# Patient Record
Sex: FEMALE | Race: WHITE | URBAN - METROPOLITAN AREA
[De-identification: names, ages, dates, MRNs, and addresses within clinical notes are randomized per-mention and may not be internally consistent; named-entity substitution may affect disease eponyms.]

---

## 2021-10-11 ENCOUNTER — OFFICE VISIT (OUTPATIENT)
Dept: FAMILY MEDICINE CLINIC | Facility: CLINIC | Age: 46
End: 2021-10-11
Payer: COMMERCIAL

## 2021-10-11 VITALS
DIASTOLIC BLOOD PRESSURE: 84 MMHG | HEIGHT: 65 IN | OXYGEN SATURATION: 97 % | RESPIRATION RATE: 20 BRPM | WEIGHT: 293 LBS | HEART RATE: 100 BPM | TEMPERATURE: 98 F | BODY MASS INDEX: 48.82 KG/M2 | SYSTOLIC BLOOD PRESSURE: 126 MMHG

## 2021-10-11 DIAGNOSIS — Z00.00 HEALTH CARE MAINTENANCE: Primary | ICD-10-CM

## 2021-10-11 DIAGNOSIS — J30.89 SEASONAL ALLERGIC RHINITIS DUE TO OTHER ALLERGIC TRIGGER: ICD-10-CM

## 2021-10-11 PROBLEM — J30.9 ALLERGIC RHINITIS DUE TO ALLERGEN: Status: ACTIVE | Noted: 2021-10-11

## 2021-10-11 PROCEDURE — 90686 IIV4 VACC NO PRSV 0.5 ML IM: CPT | Performed by: FAMILY MEDICINE

## 2021-10-11 PROCEDURE — 3074F SYST BP LT 130 MM HG: CPT | Performed by: FAMILY MEDICINE

## 2021-10-11 PROCEDURE — 3079F DIAST BP 80-89 MM HG: CPT | Performed by: FAMILY MEDICINE

## 2021-10-11 PROCEDURE — 3008F BODY MASS INDEX DOCD: CPT | Performed by: FAMILY MEDICINE

## 2021-10-11 PROCEDURE — 90471 IMMUNIZATION ADMIN: CPT | Performed by: FAMILY MEDICINE

## 2021-10-11 PROCEDURE — 99386 PREV VISIT NEW AGE 40-64: CPT | Performed by: FAMILY MEDICINE

## 2021-10-11 RX ORDER — ALBUTEROL SULFATE 90 UG/1
AEROSOL, METERED RESPIRATORY (INHALATION)
COMMUNITY
Start: 2021-05-11 | End: 2021-10-11

## 2021-10-11 RX ORDER — METHYLPREDNISOLONE 4 MG/1
TABLET ORAL
COMMUNITY
Start: 2021-05-11 | End: 2021-10-11 | Stop reason: ALTCHOICE

## 2021-10-11 RX ORDER — MONTELUKAST SODIUM 10 MG/1
10 TABLET ORAL NIGHTLY
Qty: 90 TABLET | Refills: 3 | Status: SHIPPED | OUTPATIENT
Start: 2021-10-11

## 2021-10-11 RX ORDER — MONTELUKAST SODIUM 10 MG/1
TABLET ORAL
COMMUNITY
Start: 2021-05-11 | End: 2021-10-11

## 2021-10-11 RX ORDER — ALBUTEROL SULFATE 90 UG/1
1 AEROSOL, METERED RESPIRATORY (INHALATION) EVERY 6 HOURS PRN
Qty: 1 EACH | Refills: 3 | Status: SHIPPED | OUTPATIENT
Start: 2021-10-11

## 2021-10-11 NOTE — PROGRESS NOTES
Chief Complaint:   Patient presents with:  Establish Care      HPI:   This is a 48year old female coming in for healthcare check.,  To get established. Feeling well. Reviewed personal history. Patient sees Marisela Strickland for GYN.     Patient with r HPI    Denies shortness of breath, wheezing, cough or sputum production. GASTROINTESTINAL:  Denies abdominal pain, nausea, vomiting, constipation, diarrhea  : denies dysuria, no urinary frequency , no discharge.   MUSCULOSKELETAL:  Denies weakness, muscl noted.    ASSESSMENT AND PLAN:   1. Health care maintenance  Healthy   Check labs. - FLULAVAL INFLUENZA VACCINE QUAD PRESERVATIVE FREE 0.5 ML  - CBC WITH DIFFERENTIAL WITH PLATELET; Future  - COMP METABOLIC PANEL (14); Future  - LIPID PANEL;  Future  -

## 2021-10-23 ENCOUNTER — LABORATORY ENCOUNTER (OUTPATIENT)
Dept: LAB | Age: 46
End: 2021-10-23
Attending: FAMILY MEDICINE
Payer: COMMERCIAL

## 2021-10-23 DIAGNOSIS — Z00.00 HEALTH CARE MAINTENANCE: ICD-10-CM

## 2021-10-23 PROCEDURE — 81001 URINALYSIS AUTO W/SCOPE: CPT | Performed by: FAMILY MEDICINE

## 2021-10-23 PROCEDURE — 80050 GENERAL HEALTH PANEL: CPT | Performed by: FAMILY MEDICINE

## 2021-10-23 PROCEDURE — 80061 LIPID PANEL: CPT | Performed by: FAMILY MEDICINE

## 2021-10-28 ENCOUNTER — TELEPHONE (OUTPATIENT)
Dept: FAMILY MEDICINE CLINIC | Facility: CLINIC | Age: 46
End: 2021-10-28

## 2021-10-28 NOTE — TELEPHONE ENCOUNTER
----- Message from Adan Simons MD sent at 10/28/2021  9:16 AM CDT -----  Labs reviewed.   (Recent health check)Thyroid testing normal.Chemistry profile-blood sugar, kidney function and liver function tests normal.Cholesterol profile-total 154, HDL 44,

## 2022-10-05 ENCOUNTER — TELEPHONE (OUTPATIENT)
Dept: FAMILY MEDICINE CLINIC | Facility: CLINIC | Age: 47
End: 2022-10-05

## 2022-10-05 DIAGNOSIS — Z00.00 HEALTH CARE MAINTENANCE: Primary | ICD-10-CM

## 2022-10-05 NOTE — TELEPHONE ENCOUNTER
Future Appointments   Date Time Provider Paulette Hyatt   10/10/2022 11:30 AM [facility]   10/11/2022  2:00 PM Nilda Velazquez MD [facility]

## 2022-10-10 ENCOUNTER — LABORATORY ENCOUNTER (OUTPATIENT)
Dept: LAB | Age: 47
End: 2022-10-10
Attending: FAMILY MEDICINE
Payer: COMMERCIAL

## 2022-10-10 DIAGNOSIS — Z00.00 HEALTH CARE MAINTENANCE: ICD-10-CM

## 2022-10-10 LAB
ALBUMIN SERPL-MCNC: 3.3 G/DL (ref 3.4–5)
ALBUMIN/GLOB SERPL: 0.9 {RATIO} (ref 1–2)
ALP LIVER SERPL-CCNC: 58 U/L
ALT SERPL-CCNC: 23 U/L
ANION GAP SERPL CALC-SCNC: 5 MMOL/L (ref 0–18)
AST SERPL-CCNC: 9 U/L (ref 15–37)
BASOPHILS # BLD AUTO: 0.03 X10(3) UL (ref 0–0.2)
BASOPHILS NFR BLD AUTO: 0.3 %
BILIRUB SERPL-MCNC: 0.6 MG/DL (ref 0.1–2)
BUN BLD-MCNC: 9 MG/DL (ref 7–18)
CALCIUM BLD-MCNC: 8.7 MG/DL (ref 8.5–10.1)
CHLORIDE SERPL-SCNC: 109 MMOL/L (ref 98–112)
CHOLEST SERPL-MCNC: 189 MG/DL (ref ?–200)
CO2 SERPL-SCNC: 24 MMOL/L (ref 21–32)
CREAT BLD-MCNC: 0.71 MG/DL
EOSINOPHIL # BLD AUTO: 0.31 X10(3) UL (ref 0–0.7)
EOSINOPHIL NFR BLD AUTO: 3.2 %
ERYTHROCYTE [DISTWIDTH] IN BLOOD BY AUTOMATED COUNT: 13.1 %
FASTING PATIENT LIPID ANSWER: YES
FASTING STATUS PATIENT QL REPORTED: YES
GFR SERPLBLD BASED ON 1.73 SQ M-ARVRAT: 104 ML/MIN/1.73M2 (ref 60–?)
GLOBULIN PLAS-MCNC: 3.7 G/DL (ref 2.8–4.4)
GLUCOSE BLD-MCNC: 90 MG/DL (ref 70–99)
HCT VFR BLD AUTO: 42.9 %
HDLC SERPL-MCNC: 48 MG/DL (ref 40–59)
HGB BLD-MCNC: 13.7 G/DL
IMM GRANULOCYTES # BLD AUTO: 0.02 X10(3) UL (ref 0–1)
IMM GRANULOCYTES NFR BLD: 0.2 %
LDLC SERPL CALC-MCNC: 124 MG/DL (ref ?–100)
LYMPHOCYTES # BLD AUTO: 2.73 X10(3) UL (ref 1–4)
LYMPHOCYTES NFR BLD AUTO: 28.3 %
MCH RBC QN AUTO: 29.6 PG (ref 26–34)
MCHC RBC AUTO-ENTMCNC: 31.9 G/DL (ref 31–37)
MCV RBC AUTO: 92.7 FL
MONOCYTES # BLD AUTO: 0.74 X10(3) UL (ref 0.1–1)
MONOCYTES NFR BLD AUTO: 7.7 %
NEUTROPHILS # BLD AUTO: 5.82 X10 (3) UL (ref 1.5–7.7)
NEUTROPHILS # BLD AUTO: 5.82 X10(3) UL (ref 1.5–7.7)
NEUTROPHILS NFR BLD AUTO: 60.3 %
NONHDLC SERPL-MCNC: 141 MG/DL (ref ?–130)
OSMOLALITY SERPL CALC.SUM OF ELEC: 284 MOSM/KG (ref 275–295)
PLATELET # BLD AUTO: 337 10(3)UL (ref 150–450)
POTASSIUM SERPL-SCNC: 4 MMOL/L (ref 3.5–5.1)
PROT SERPL-MCNC: 7 G/DL (ref 6.4–8.2)
RBC # BLD AUTO: 4.63 X10(6)UL
SODIUM SERPL-SCNC: 138 MMOL/L (ref 136–145)
TRIGL SERPL-MCNC: 91 MG/DL (ref 30–149)
TSI SER-ACNC: 1.76 MIU/ML (ref 0.36–3.74)
VLDLC SERPL CALC-MCNC: 16 MG/DL (ref 0–30)
WBC # BLD AUTO: 9.7 X10(3) UL (ref 4–11)

## 2022-10-10 PROCEDURE — 80050 GENERAL HEALTH PANEL: CPT | Performed by: FAMILY MEDICINE

## 2022-10-10 PROCEDURE — 80061 LIPID PANEL: CPT | Performed by: FAMILY MEDICINE

## 2022-10-11 ENCOUNTER — OFFICE VISIT (OUTPATIENT)
Dept: FAMILY MEDICINE CLINIC | Facility: CLINIC | Age: 47
End: 2022-10-11
Payer: COMMERCIAL

## 2022-10-11 VITALS
DIASTOLIC BLOOD PRESSURE: 86 MMHG | HEART RATE: 99 BPM | RESPIRATION RATE: 18 BRPM | SYSTOLIC BLOOD PRESSURE: 124 MMHG | HEIGHT: 65 IN | OXYGEN SATURATION: 97 % | WEIGHT: 293 LBS | TEMPERATURE: 98 F | BODY MASS INDEX: 48.82 KG/M2

## 2022-10-11 DIAGNOSIS — Z00.00 HEALTH CARE MAINTENANCE: Primary | ICD-10-CM

## 2022-10-11 LAB
BILIRUB UR QL STRIP.AUTO: NEGATIVE
CLARITY UR REFRACT.AUTO: CLEAR
GLUCOSE UR STRIP.AUTO-MCNC: NEGATIVE MG/DL
KETONES UR STRIP.AUTO-MCNC: NEGATIVE MG/DL
LEUKOCYTE ESTERASE UR QL STRIP.AUTO: NEGATIVE
NITRITE UR QL STRIP.AUTO: NEGATIVE
PH UR STRIP.AUTO: 6 [PH] (ref 5–8)
PROT UR STRIP.AUTO-MCNC: NEGATIVE MG/DL
RBC UR QL AUTO: NEGATIVE
SP GR UR STRIP.AUTO: 1.01 (ref 1–1.03)
UROBILINOGEN UR STRIP.AUTO-MCNC: <2 MG/DL

## 2022-10-11 PROCEDURE — 81003 URINALYSIS AUTO W/O SCOPE: CPT | Performed by: FAMILY MEDICINE

## 2022-10-11 PROCEDURE — 3079F DIAST BP 80-89 MM HG: CPT | Performed by: FAMILY MEDICINE

## 2022-10-11 PROCEDURE — 3074F SYST BP LT 130 MM HG: CPT | Performed by: FAMILY MEDICINE

## 2022-10-11 PROCEDURE — 99396 PREV VISIT EST AGE 40-64: CPT | Performed by: FAMILY MEDICINE

## 2022-10-11 PROCEDURE — 3008F BODY MASS INDEX DOCD: CPT | Performed by: FAMILY MEDICINE

## 2022-10-11 RX ORDER — MONTELUKAST SODIUM 10 MG/1
10 TABLET ORAL NIGHTLY
Qty: 90 TABLET | Refills: 3 | Status: SHIPPED | OUTPATIENT
Start: 2022-10-11

## 2022-10-11 NOTE — PATIENT INSTRUCTIONS
Good exam       Consider a 10 - 20 minute walk     Music on a playlist     Connect with Nickie Necessary      Recheck in 6 - 8 weeks.

## 2022-10-31 RX ORDER — ALBUTEROL SULFATE 90 UG/1
AEROSOL, METERED RESPIRATORY (INHALATION)
Qty: 6.7 EACH | Refills: 3 | Status: SHIPPED | OUTPATIENT
Start: 2022-10-31

## 2022-10-31 NOTE — TELEPHONE ENCOUNTER
Future appt: Your appointments     Date & Time Appointment Department [facility])    Nov 01, 2022  4:00 PM CDT Video Visit with Yaquelin Petty Dr ([place])    Please verify your telehealth insurance benefits prior to your appointment. You must be in the state of [place] during the virtual visit. Please use the [employer] and launch the video visit 10 minutes prior to your scheduled appointment time to ensure your camera and microphone are working properly. Once the video visit has started you will be placed in a waiting room until the provider begins the visit. You will receive an email confirmation with instructions. If you have questions, call your doctor's office directly. If you are having issues or need to use a desktop/laptop, please follow the below steps:        1. Close out all other open apps (could be competing for audio resources)  2. Disable Bluetooth  3.       Reboot mobile device before joining the video  4. Come off Wi-Fi and switch over to Data    Please see our Video Visit Tip Sheet if you need additional assistance. If you believe this is an emergency, please dial 911 immediately. Nov 08, 2022  2:00 PM CST Video Visit with Yaquelin Petty Dr ([place])    Please verify your telehealth insurance benefits prior to your appointment. You must be in the state of [place] during the virtual visit. Please use the [employer] and launch the video visit 10 minutes prior to your scheduled appointment time to ensure your camera and microphone are working properly. Once the video visit has started you will be placed in a waiting room until the provider begins the visit. You will receive an email confirmation with instructions. If you have questions, call your doctor's office directly.     If you are having issues or need to use a desktop/laptop, please follow the below steps:        1. Close out all other open apps (could be competing for audio resources)  2. Disable Bluetooth  3.       Reboot mobile device before joining the video  4. Come off Wi-Fi and switch over to Data    Please see our Video Visit Tip Sheet if you need additional assistance. If you believe this is an emergency, please dial 911 immediately. Nov 15, 2022  4:00 PM CST Video Visit with Yaquelin Whittaker Dr ([facility])    Please verify your telehealth insurance benefits prior to your appointment. You must be in the state of [place] during the virtual visit. Please use the [email or web address] Mobile Forrest and launch the video visit 10 minutes prior to your scheduled appointment time to ensure your camera and microphone are working properly. Once the video visit has started you will be placed in a waiting room until the provider begins the visit. You will receive an email confirmation with instructions. If you have questions, call your doctor's office directly. If you are having issues or need to use a desktop/laptop, please follow the below steps:        1. Close out all other open apps (could be competing for audio resources)  2. Disable Bluetooth  3.       Reboot mobile device before joining the video  4. Come off Wi-Fi and switch over to Data    Please see our Video Visit Tip Sheet if you need additional assistance. If you believe this is an emergency, please dial 911 immediately.           Dec 07, 2022  8:30 AM CST Follow Up Visit with Nilda Velazquez MD [facility] [place]            25 [facility] [place]  [address] [id number]  [address] [facility]  [address] [place] [id number]  [phone]        Last Appointment with provider:   10/11/2022 Physical  Last appointment at [facility]:  10/11/2022  Cholesterol, Total (mg/dL)   Date Value   10/10/2022 189     HDL Cholesterol (mg/dL)   Date Value   10/10/2022 48     LDL Cholesterol (mg/dL)   Date Value   10/10/2022 124 (H)     Triglycerides (mg/dL)   Date Value   10/10/2022 91     No results found for: EAG, A1C  Lab Results   Component Value Date    TSH 1.760 10/10/2022       No follow-ups on file.

## 2023-01-05 ENCOUNTER — TELEPHONE (OUTPATIENT)
Dept: FAMILY MEDICINE CLINIC | Facility: CLINIC | Age: 48
End: 2023-01-05

## 2023-01-05 NOTE — TELEPHONE ENCOUNTER
LM that missed no showed for an appt on 1/4/23 with Dr. Missy Tracey and that there is a 40.00 no show fee.  Patient rescheduled on mychart

## 2023-03-29 RX ORDER — PAROXETINE 10 MG/1
10 TABLET, FILM COATED ORAL EVERY MORNING
Qty: 90 TABLET | Refills: 1 | OUTPATIENT
Start: 2023-03-29

## 2023-07-13 ENCOUNTER — TELEPHONE (OUTPATIENT)
Dept: FAMILY MEDICINE CLINIC | Facility: CLINIC | Age: 48
End: 2023-07-13

## 2023-07-13 NOTE — TELEPHONE ENCOUNTER
Patient states she is concerned for new lumps to left breast. Not painful. Last Mammogram 2/10/23. Patient is asking how to proceed at this time.  Would she need an Ultrasound of left breast?

## 2023-07-14 NOTE — TELEPHONE ENCOUNTER
Spoke to pt advised of recommendations below. Pt understands and is agreeable. Scheduled with CS 7/19/23.

## 2023-07-28 ENCOUNTER — OFFICE VISIT (OUTPATIENT)
Dept: FAMILY MEDICINE CLINIC | Facility: CLINIC | Age: 48
End: 2023-07-28
Payer: COMMERCIAL

## 2023-07-28 ENCOUNTER — LAB ENCOUNTER (OUTPATIENT)
Dept: LAB | Age: 48
End: 2023-07-28
Attending: NURSE PRACTITIONER
Payer: COMMERCIAL

## 2023-07-28 VITALS
WEIGHT: 293 LBS | DIASTOLIC BLOOD PRESSURE: 88 MMHG | HEART RATE: 98 BPM | BODY MASS INDEX: 50.02 KG/M2 | SYSTOLIC BLOOD PRESSURE: 138 MMHG | RESPIRATION RATE: 20 BRPM | TEMPERATURE: 98 F | OXYGEN SATURATION: 95 % | HEIGHT: 64.25 IN

## 2023-07-28 DIAGNOSIS — N63.22 BREAST LUMP ON LEFT SIDE AT 11 O'CLOCK POSITION: Primary | ICD-10-CM

## 2023-07-28 DIAGNOSIS — N63.22 BREAST LUMP ON LEFT SIDE AT 11 O'CLOCK POSITION: ICD-10-CM

## 2023-07-28 DIAGNOSIS — N63.25 BREAST LUMP ON LEFT SIDE AT 12 O'CLOCK POSITION: ICD-10-CM

## 2023-07-28 DIAGNOSIS — R19.06 EPIGASTRIC MASS: ICD-10-CM

## 2023-07-28 LAB
ALBUMIN SERPL-MCNC: 3.6 G/DL (ref 3.4–5)
ALBUMIN/GLOB SERPL: 0.9 {RATIO} (ref 1–2)
ALP LIVER SERPL-CCNC: 59 U/L
ALT SERPL-CCNC: 34 U/L
ANION GAP SERPL CALC-SCNC: 6 MMOL/L (ref 0–18)
AST SERPL-CCNC: 17 U/L (ref 15–37)
BILIRUB SERPL-MCNC: 0.5 MG/DL (ref 0.1–2)
BUN BLD-MCNC: 10 MG/DL (ref 7–18)
CALCIUM BLD-MCNC: 9.5 MG/DL (ref 8.5–10.1)
CHLORIDE SERPL-SCNC: 107 MMOL/L (ref 98–112)
CO2 SERPL-SCNC: 26 MMOL/L (ref 21–32)
CREAT BLD-MCNC: 0.78 MG/DL
EGFRCR SERPLBLD CKD-EPI 2021: 92 ML/MIN/1.73M2 (ref 60–?)
FASTING STATUS PATIENT QL REPORTED: YES
GLOBULIN PLAS-MCNC: 3.8 G/DL (ref 2.8–4.4)
GLUCOSE BLD-MCNC: 108 MG/DL (ref 70–99)
OSMOLALITY SERPL CALC.SUM OF ELEC: 288 MOSM/KG (ref 275–295)
POTASSIUM SERPL-SCNC: 4.4 MMOL/L (ref 3.5–5.1)
PROT SERPL-MCNC: 7.4 G/DL (ref 6.4–8.2)
SODIUM SERPL-SCNC: 139 MMOL/L (ref 136–145)

## 2023-07-28 PROCEDURE — 3075F SYST BP GE 130 - 139MM HG: CPT | Performed by: NURSE PRACTITIONER

## 2023-07-28 PROCEDURE — 80053 COMPREHEN METABOLIC PANEL: CPT | Performed by: NURSE PRACTITIONER

## 2023-07-28 PROCEDURE — 3008F BODY MASS INDEX DOCD: CPT | Performed by: NURSE PRACTITIONER

## 2023-07-28 PROCEDURE — 99213 OFFICE O/P EST LOW 20 MIN: CPT | Performed by: NURSE PRACTITIONER

## 2023-07-28 PROCEDURE — 3079F DIAST BP 80-89 MM HG: CPT | Performed by: NURSE PRACTITIONER

## 2023-07-28 NOTE — PATIENT INSTRUCTIONS
Schedule scans at [facility]. Please call [facility] patient scheduling at [phone] for appointment    Get lab today. Follow up as needed.

## 2023-08-15 ENCOUNTER — TELEPHONE (OUTPATIENT)
Dept: FAMILY MEDICINE CLINIC | Facility: CLINIC | Age: 48
End: 2023-08-15

## 2023-08-18 ENCOUNTER — TELEPHONE (OUTPATIENT)
Dept: FAMILY MEDICINE CLINIC | Facility: CLINIC | Age: 48
End: 2023-08-18

## 2023-08-18 NOTE — TELEPHONE ENCOUNTER
Fax received from [facility] radiology CT abd/pelvis w/iv contrast only. Report on covering providers desk for review and recommendations.

## 2023-08-18 NOTE — TELEPHONE ENCOUNTER
Recommend to monitor the hernia. Return to clinic if increases in size or becomes painful. Please verify that patient is scheduling the breast exam. Thank you.

## 2023-08-18 NOTE — TELEPHONE ENCOUNTER
Report reviewed with TR, small abdominal hernia containing fat and lipoma on ovary noted. Advised pt of this and CS will be back Monday 8/21 to go over in more detail. Pt understands and is agreeable.

## 2023-08-21 NOTE — TELEPHONE ENCOUNTER
Spoke with patient regarding the below recommendations.  Patient is scheduled tomorrow 8/22/23 for breast exam.

## 2023-08-22 ENCOUNTER — TELEPHONE (OUTPATIENT)
Dept: FAMILY MEDICINE CLINIC | Facility: CLINIC | Age: 48
End: 2023-08-22

## 2023-08-22 DIAGNOSIS — R92.8 ABNORMAL MAMMOGRAM OF LEFT BREAST: Primary | ICD-10-CM

## 2023-08-22 DIAGNOSIS — N63.25 BREAST LUMP ON LEFT SIDE AT 12 O'CLOCK POSITION: ICD-10-CM

## 2023-08-22 NOTE — TELEPHONE ENCOUNTER
Imaging Results - (ABNORMAL) Ultrasound Breast Diagnostic Limited Left (08/22/2023 3:09 PM CDT)  Impressions   08/22/2023 3:16 PM CDT   AND RECOMMENDATION:  Indeterminate calcifications left breast.  Stereotactic biopsy is  recommended. 6    A written summary of the findings and recommendations was given to the  patient at the time of the examination. The chief value of a mammogram is to detect a non-palpable cancer. A  negative mammogram should not deter further workup if it is clinically  warranted. Approximately 10% of palpable malignancies cannot be visualized  radiographically. Overall BI-RADS category: 4B - Moderate Suspicion for Malignancy    BREAST TISSUE DENSITY : B - Scattered fibroglandular density      Imaging Results - (ABNORMAL) Ultrasound Breast Diagnostic Limited Left (08/22/2023 3:09 PM CDT)  Narrative   08/22/2023 3:16 PM CDT   This result has an attachment that is not available. HISTORY:  46year old female presents for diagnostic. Patient has no documented  relevant family history. COMPARISON:  Prior breast examinations were compared. TECHNIQUE:  Mammography Diagnostic Left with Tomosynthesis  Ultrasound Breast Diagnostic Limited Left    FINDINGS:  Mammography Diagnostic Left with Tomosynthesis  The left breast has scattered areas of fibroglandular density. There are  some benign-appearing calcifications identified in the left breast.  There  are indeterminate calcifications identified in the left upper outer  quadrant of the breast in the retroareolar region at 1:00. Stereotactic  biopsy is recommended. There is no mammographic abnormality identified at the 2 sites of palpable  abnormality identified in the left breast at 1:00 posterior depth and 9:00  anterior depth.     Ultrasound Breast Diagnostic Limited Left  There is no ultrasonographic abnormality identified at the site of  palpable abnormality in the left breast at 1:00 18 cm from the nipple and  at 9:00 5 cm from the nipple.

## 2023-08-25 ENCOUNTER — TELEPHONE (OUTPATIENT)
Dept: FAMILY MEDICINE CLINIC | Facility: CLINIC | Age: 48
End: 2023-08-25

## 2023-08-25 NOTE — TELEPHONE ENCOUNTER
Specimen: Tissue - Specimen from breast (specimen)  Component 1 d ago   Case Report Surgical Pathology Report                         Case: [id number]                                Authorizing Provider:  KYLE Uriarte     Collected:           08/24/2023 1213              Ordering Location:     [facility]               Received[facility]            [facility]24/2023 [facility]              Pathologist:           Teofilo Luis MD, PhD                                                          Specimen:    Breast, Left, Left Breast 1:00 Retroareolar                                             Final Diagnosis    A. Left breast, at 1:00 and retroareolar, core biopsy:               Fragments of breast tissue with columnar cell change, columnar cell hyperplasia, adenosis, cyst and stromal fibrosis with microcalcification, no evidence of malignancy. Electronically signed by Teofilo Luis MD, PhD on 8/25/2023 at  9:38 AM   Clinical Information    Specimen Description:  Left Breast at 1 o'clock, retroareolar. Gross Description    A. Breast, Left. Received in formalin in a biopsy collection cassette, are multiple tan-white tissue fragments with extensive amounts of blood clot material measuring 5.0 x 3.2 x 1.1 cm in aggregate. The indicated calcifications are submitted in Cassettes A1-A3. The remainder of the specimen is submitted in Cassettes A4-A17. Grossing performed by Rubi Treadwell.      Resulting Agency [facility]   Specimen Collected: 08/24/23 12:13 PM Last Resulted: 08/25/23  9:38 AM   Received From: [address] [place] [address]  Result Received: 08/25/23 11:58 AM

## 2023-09-28 RX ORDER — MONTELUKAST SODIUM 10 MG/1
10 TABLET ORAL NIGHTLY
Qty: 90 TABLET | Refills: 0 | Status: SHIPPED | OUTPATIENT
Start: 2023-09-28

## 2023-09-28 NOTE — TELEPHONE ENCOUNTER
Future appt: Your appointments       Date & Time Appointment Department [facility])    Oct 13, 2023  4:00 PM CDT Physical - Established with Dianne Finch  [address], Caryle Flatter ([facility])              [address] [place] [address] [facility]  [facility] [id number]  [phone]          Last Appointment with provider:   6/13/2023  Last appointment at [facility]:  7/28/2023  Cholesterol, Total (mg/dL)   Date Value   10/10/2022 189     HDL Cholesterol (mg/dL)   Date Value   10/10/2022 48     LDL Cholesterol (mg/dL)   Date Value   10/10/2022 124 (H)     Triglycerides (mg/dL)   Date Value   10/10/2022 91     No results found for: \"EAG\", \"A1C\"  Lab Results   Component Value Date    TSH 1.760 10/10/2022       No follow-ups on file.